# Patient Record
Sex: FEMALE | Race: WHITE
[De-identification: names, ages, dates, MRNs, and addresses within clinical notes are randomized per-mention and may not be internally consistent; named-entity substitution may affect disease eponyms.]

---

## 2018-02-23 ENCOUNTER — HOSPITAL ENCOUNTER (OUTPATIENT)
Dept: HOSPITAL 17 - NEPD | Age: 83
Setting detail: OBSERVATION
LOS: 1 days | Discharge: HOME | End: 2018-02-24
Attending: HOSPITALIST | Admitting: HOSPITALIST
Payer: COMMERCIAL

## 2018-02-23 VITALS
HEART RATE: 64 BPM | TEMPERATURE: 98.4 F | DIASTOLIC BLOOD PRESSURE: 75 MMHG | SYSTOLIC BLOOD PRESSURE: 170 MMHG | OXYGEN SATURATION: 96 % | RESPIRATION RATE: 17 BRPM

## 2018-02-23 VITALS — HEART RATE: 68 BPM

## 2018-02-23 VITALS — SYSTOLIC BLOOD PRESSURE: 160 MMHG | DIASTOLIC BLOOD PRESSURE: 65 MMHG

## 2018-02-23 VITALS
TEMPERATURE: 97.9 F | SYSTOLIC BLOOD PRESSURE: 153 MMHG | HEART RATE: 66 BPM | DIASTOLIC BLOOD PRESSURE: 70 MMHG | RESPIRATION RATE: 17 BRPM | OXYGEN SATURATION: 95 %

## 2018-02-23 VITALS
OXYGEN SATURATION: 94 % | SYSTOLIC BLOOD PRESSURE: 176 MMHG | DIASTOLIC BLOOD PRESSURE: 81 MMHG | HEART RATE: 80 BPM | TEMPERATURE: 99.2 F | RESPIRATION RATE: 16 BRPM

## 2018-02-23 VITALS
SYSTOLIC BLOOD PRESSURE: 160 MMHG | DIASTOLIC BLOOD PRESSURE: 70 MMHG | HEART RATE: 66 BPM | TEMPERATURE: 98 F | RESPIRATION RATE: 21 BRPM | OXYGEN SATURATION: 96 %

## 2018-02-23 VITALS — WEIGHT: 120.15 LBS | HEIGHT: 57 IN | BODY MASS INDEX: 25.92 KG/M2

## 2018-02-23 DIAGNOSIS — R94.31: ICD-10-CM

## 2018-02-23 DIAGNOSIS — E03.9: ICD-10-CM

## 2018-02-23 DIAGNOSIS — E78.5: ICD-10-CM

## 2018-02-23 DIAGNOSIS — K21.9: ICD-10-CM

## 2018-02-23 DIAGNOSIS — R10.11: Primary | ICD-10-CM

## 2018-02-23 DIAGNOSIS — H91.90: ICD-10-CM

## 2018-02-23 DIAGNOSIS — Z86.73: ICD-10-CM

## 2018-02-23 DIAGNOSIS — D64.9: ICD-10-CM

## 2018-02-23 DIAGNOSIS — I10: ICD-10-CM

## 2018-02-23 LAB
ALBUMIN SERPL-MCNC: 3.4 GM/DL (ref 3.4–5)
ALP SERPL-CCNC: 105 U/L (ref 45–117)
ALT SERPL-CCNC: 20 U/L (ref 10–53)
AST SERPL-CCNC: 17 U/L (ref 15–37)
BASOPHILS # BLD AUTO: 0 TH/MM3 (ref 0–0.2)
BASOPHILS NFR BLD: 0.5 % (ref 0–2)
BILIRUB SERPL-MCNC: 0.4 MG/DL (ref 0.2–1)
BUN SERPL-MCNC: 12 MG/DL (ref 7–18)
CALCIUM SERPL-MCNC: 8.8 MG/DL (ref 8.5–10.1)
CHLORIDE SERPL-SCNC: 108 MEQ/L (ref 98–107)
CREAT SERPL-MCNC: 0.76 MG/DL (ref 0.5–1)
EOSINOPHIL # BLD: 0.2 TH/MM3 (ref 0–0.4)
EOSINOPHIL NFR BLD: 2.4 % (ref 0–4)
ERYTHROCYTE [DISTWIDTH] IN BLOOD BY AUTOMATED COUNT: 13.4 % (ref 11.6–17.2)
GFR SERPLBLD BASED ON 1.73 SQ M-ARVRAT: 72 ML/MIN (ref 89–?)
GLUCOSE SERPL-MCNC: 135 MG/DL (ref 74–106)
HCO3 BLD-SCNC: 24.6 MEQ/L (ref 21–32)
HCT VFR BLD CALC: 36.4 % (ref 35–46)
HGB BLD-MCNC: 12.2 GM/DL (ref 11.6–15.3)
INR PPP: 1 RATIO
LYMPHOCYTES # BLD AUTO: 1.9 TH/MM3 (ref 1–4.8)
LYMPHOCYTES NFR BLD AUTO: 21.6 % (ref 9–44)
MCH RBC QN AUTO: 32.4 PG (ref 27–34)
MCHC RBC AUTO-ENTMCNC: 33.5 % (ref 32–36)
MCV RBC AUTO: 96.7 FL (ref 80–100)
MONOCYTE #: 0.6 TH/MM3 (ref 0–0.9)
MONOCYTES NFR BLD: 6.7 % (ref 0–8)
NEUTROPHILS # BLD AUTO: 6.2 TH/MM3 (ref 1.8–7.7)
NEUTROPHILS NFR BLD AUTO: 68.8 % (ref 16–70)
PLATELET # BLD: 222 TH/MM3 (ref 150–450)
PMV BLD AUTO: 8.2 FL (ref 7–11)
PROT SERPL-MCNC: 7.4 GM/DL (ref 6.4–8.2)
PROTHROMBIN TIME: 10 SEC (ref 9.8–11.6)
RBC # BLD AUTO: 3.77 MIL/MM3 (ref 4–5.3)
SODIUM SERPL-SCNC: 140 MEQ/L (ref 136–145)
TROPONIN I SERPL-MCNC: (no result) NG/ML (ref 0.02–0.05)
TROPONIN I SERPL-MCNC: (no result) NG/ML (ref 0.02–0.05)
WBC # BLD AUTO: 9 TH/MM3 (ref 4–11)

## 2018-02-23 PROCEDURE — 83690 ASSAY OF LIPASE: CPT

## 2018-02-23 PROCEDURE — 96372 THER/PROPH/DIAG INJ SC/IM: CPT

## 2018-02-23 PROCEDURE — 76705 ECHO EXAM OF ABDOMEN: CPT

## 2018-02-23 PROCEDURE — 80053 COMPREHEN METABOLIC PANEL: CPT

## 2018-02-23 PROCEDURE — 84484 ASSAY OF TROPONIN QUANT: CPT

## 2018-02-23 PROCEDURE — 85730 THROMBOPLASTIN TIME PARTIAL: CPT

## 2018-02-23 PROCEDURE — 99285 EMERGENCY DEPT VISIT HI MDM: CPT

## 2018-02-23 PROCEDURE — 74176 CT ABD & PELVIS W/O CONTRAST: CPT

## 2018-02-23 PROCEDURE — A9537 TC99M MEBROFENIN: HCPCS

## 2018-02-23 PROCEDURE — 85610 PROTHROMBIN TIME: CPT

## 2018-02-23 PROCEDURE — 82550 ASSAY OF CK (CPK): CPT

## 2018-02-23 PROCEDURE — G0378 HOSPITAL OBSERVATION PER HR: HCPCS

## 2018-02-23 PROCEDURE — 96360 HYDRATION IV INFUSION INIT: CPT

## 2018-02-23 PROCEDURE — 85025 COMPLETE CBC W/AUTO DIFF WBC: CPT

## 2018-02-23 PROCEDURE — 78227 HEPATOBIL SYST IMAGE W/DRUG: CPT

## 2018-02-23 PROCEDURE — 93005 ELECTROCARDIOGRAM TRACING: CPT

## 2018-02-23 RX ADMIN — Medication SCH ML: at 21:02

## 2018-02-23 RX ADMIN — HEPARIN SODIUM SCH UNITS: 10000 INJECTION, SOLUTION INTRAVENOUS; SUBCUTANEOUS at 21:03

## 2018-02-23 RX ADMIN — STANDARDIZED SENNA CONCENTRATE AND DOCUSATE SODIUM SCH TAB: 8.6; 5 TABLET, FILM COATED ORAL at 21:00

## 2018-02-23 RX ADMIN — LISINOPRIL SCH MG: 10 TABLET ORAL at 21:01

## 2018-02-23 RX ADMIN — HEPARIN SODIUM SCH UNITS: 10000 INJECTION, SOLUTION INTRAVENOUS; SUBCUTANEOUS at 21:00

## 2018-02-23 NOTE — RADRPT
EXAM DATE/TIME:  02/23/2018 10:55 

 

[facility] COMPARISON:     

No previous studies available for comparison.

 

 

INDICATIONS :     

Right upper quadrant and flank pain.

                  

 

ORAL CONTRAST:      

No oral contrast ingested.

                  

 

RADIATION DOSE:     

7.84 CTDIvol (mGy) 

 

 

MEDICAL HISTORY :     

Cerebrovascular disease.  

 

SURGICAL HISTORY :      

None. 

 

ENCOUNTER:      

Initial

 

ACUITY:      

1 day

 

PAIN SCALE:      

5/10

 

LOCATION:       

Right upper quadrant 

 

TECHNIQUE:     

Renal colic protocol.  Volumetric scanning of the abdomen and pelvis was performed.  Using automated 
exposure control and adjustment of the mA and/or kV according to patient size, radiation dose was kep
t as low as reasonably achievable to obtain optimal diagnostic quality images.  DICOM format image da
ta is available electronically for review and comparison.  

 

FINDINGS:     

 

Right side: 

No evidence hydronephrosis.  No calcified stones in the collecting system or in the right ureter.

 

Left side: 

No evidence of hydronephrosis.  No calcified stones in the collecting system or along the left ureter
.  1 cm hypodensity in the cortex of the lateral mid/lower pole which cannot be further characterized
 on noncontrast study, possibly representing cysts.

 

Bladder: 

Smooth margins.  No calcifications within the lumen.

 

Other: 

No dilated loops of small and large bowel.  No calcified gallstones.  No evidence of free fluid stop 
mild curvature of the lower lumbar spine convex towards the left.

 

CONCLUSION:     

Negative renal colic CT.

 

 

 

 Edinson Momin MD on February 23, 2018 at 11:37           

Board Certified Radiologist.

 This report was verified electronically.

## 2018-02-23 NOTE — PD
HPI


Chief Complaint:  abdominal pain


Time Seen by Provider:  10:18


Travel History


International Travel<30 days:  No


Contact w/Intl Traveler<30days:  No


Traveled to known affect area:  No





History of Present Illness


HPI


c/o ruq pain, radiated to right shoulder blade, sharp, constant for past 2 weeks

, only changing in intensity, rates 8/10, denies n/v/d/fever/ha/cp at this 

time....patient states that taking deep breaths worsens radiated pain.  














nkda


pcp cora aceves


pmhx:htn, hyperchol, hypothyroid, cva with left sided weakness


pshx:





PFSH


Past Medical History


Cerebrovascular Accident:  Yes (2013, left side weakness)


Diminished Hearing:  Yes





Social History


Alcohol Use:  No


Tobacco Use:  No


Substance Use:  No





Allergies-Medications


(Allergen,Severity, Reaction):  


Coded Allergies:  


     No Known Allergies (Unverified , 2/23/18)


Reported Meds & Prescriptions





Reported Meds & Active Scripts


Active


Reported


Lisinopril 10 Mg Tab 10 Mg PO BID


Levothyroxine (Levothyroxine Sodium) 100 Mcg Tab 100 Mcg PO DAILY


Plavix (Clopidogrel Bisulfate) 75 Mg Tab 75 Mg PO DAILY


Atorvastatin (Atorvastatin Calcium) 40 Mg Tab 40 Mg PO HS


Atenolol 100 Mg Tab 100 Mg PO DAILY


Amlodipine (Amlodipine Besylate) 5 Mg Tab 5 Mg PO DAILY








Review of Systems


General / Constitutional:  No: Fever


Eyes:  No: Visual changes


HENT:  No: Headaches


Cardiovascular:  No: Chest Pain or Discomfort


Respiratory:  No: Shortness of Breath


Gastrointestinal:  Positive: Abdominal Pain (ruq)


Genitourinary:  No: Dysuria


Musculoskeletal:  No: Pain


Skin:  No Rash


Neurologic:  No: Weakness


Psychiatric:  No: Depression


Endocrine:  No: Polydipsia


Hematologic/Lymphatic:  No: Easy Bruising





Physical Exam


Narrative


GENERAL: 


SKIN: Warm and dry.


HEAD: Atraumatic. Normocephalic. 


EYES: Pupils equal and round. No scleral icterus. No injection or drainage. 


ENT: No nasal bleeding or discharge.  Mucous membranes pink and moist.


NECK: Trachea midline. No JVD. 


CARDIOVASCULAR: Regular rate and rhythm.  


RESPIRATORY: No accessory muscle use. Clear to auscultation. Breath sounds 

equal bilaterally. 


GASTROINTESTINAL: Abdomen soft, nondistended. 


MUSCULOSKELETAL: Extremities without clubbing, cyanosis, or edema. No obvious 

deformities. 


NEUROLOGICAL: Awake and alert. No obvious cranial nerve deficits.  Motor 

grossly within normal limits. Five out of 5 muscle strength in the arms and 

legs.  Normal speech.


PSYCHIATRIC: Appropriate mood and affect; insight and judgment normal.





Data


Data


Last Documented VS





Vital Signs








  Date Time  Temp Pulse Resp B/P (MAP) Pulse Ox O2 Delivery O2 Flow Rate FiO2


 


2/23/18 10:06   17   Room Air  


 


2/23/18 10:06        


 


2/23/18 09:48 99.2 80   94   








Orders





 Orders


Complete Blood Count With Diff (2/23/18 10:33)


Comprehensive Metabolic Panel (2/23/18 10:33)


Lipase (2/23/18 10:33)


Prothrombin Time / Inr (Pt) (2/23/18 10:33)


Act Partial Throm Time (Ptt) (2/23/18 10:33)


Ct Abd/Pel W/O Iv Contrast (2/23/18 10:33)


Us Abdomen Gallbladder (2/23/18 )


Iv Access Insert/Monitor (2/23/18 10:33)


Ecg Monitoring (2/23/18 10:33)


Oximetry (2/23/18 10:33)


NPO (2/23/18 10:33)


Morphine Inj (Morphine Inj) (2/23/18 10:45)


Ondansetron Inj (Zofran Inj) (2/23/18 10:45)


Sodium Chlor 0.9% 1000 Ml Inj (Ns 1000 M (2/23/18 10:33)


Sodium Chloride 0.9% Flush (Ns Flush) (2/23/18 10:45)


Electrocardiogram (2/23/18 10:33)


Ckmb (Isoenzyme) Profile (2/23/18 10:34)


Troponin I (2/23/18 10:34)





Labs





Laboratory Tests








Test


  2/23/18


10:50


 


White Blood Count 9.0 TH/MM3 


 


Red Blood Count 3.77 MIL/MM3 


 


Hemoglobin 12.2 GM/DL 


 


Hematocrit 36.4 % 


 


Mean Corpuscular Volume 96.7 FL 


 


Mean Corpuscular Hemoglobin 32.4 PG 


 


Mean Corpuscular Hemoglobin


Concent 33.5 % 


 


 


Red Cell Distribution Width 13.4 % 


 


Platelet Count 222 TH/MM3 


 


Mean Platelet Volume 8.2 FL 


 


Neutrophils (%) (Auto) 68.8 % 


 


Lymphocytes (%) (Auto) 21.6 % 


 


Monocytes (%) (Auto) 6.7 % 


 


Eosinophils (%) (Auto) 2.4 % 


 


Basophils (%) (Auto) 0.5 % 


 


Neutrophils # (Auto) 6.2 TH/MM3 


 


Lymphocytes # (Auto) 1.9 TH/MM3 


 


Monocytes # (Auto) 0.6 TH/MM3 


 


Eosinophils # (Auto) 0.2 TH/MM3 


 


Basophils # (Auto) 0.0 TH/MM3 


 


CBC Comment DIFF FINAL 


 


Differential Comment  


 


Prothrombin Time 10.0 SEC 


 


Prothromb Time International


Ratio 1.0 RATIO 


 


 


Activated Partial


Thromboplast Time 22.7 SEC 


 


 


Blood Urea Nitrogen 12 MG/DL 


 


Creatinine 0.76 MG/DL 


 


Random Glucose 135 MG/DL 


 


Total Protein 7.4 GM/DL 


 


Albumin 3.4 GM/DL 


 


Calcium Level 8.8 MG/DL 


 


Alkaline Phosphatase 105 U/L 


 


Aspartate Amino Transf


(AST/SGOT) 17 U/L 


 


 


Alanine Aminotransferase


(ALT/SGPT) 20 U/L 


 


 


Total Bilirubin 0.4 MG/DL 


 


Sodium Level 140 MEQ/L 


 


Potassium Level 3.8 MEQ/L 


 


Chloride Level 108 MEQ/L 


 


Carbon Dioxide Level 24.6 MEQ/L 


 


Anion Gap 7 MEQ/L 


 


Estimat Glomerular Filtration


Rate 72 ML/MIN 


 


 


Total Creatine Kinase 73 U/L 


 


Troponin I


  LESS THAN 0.02


NG/ML


 


Lipase 111 U/L 











[facility]


Medical Decision Making


Medical Screen Exam Complete:  Yes


Emergency Medical Condition:  Yes


Medical Record Reviewed:  Yes


Differential Diagnosis


biliary colic v cholecystitis v pancreatitis v hepatitis v colitis v 

diverticulitis V ACALCULOUS CHOLECYSTITIS V ATYPICAL NONSTEMI


Narrative Course


CBC NEG FOR LEUKOCYTOSIS, NO ANEMIA, NORMAL PLATELET





COAG PROFILE NORMAL





NL ELECTROLYTES, NL KIDNEY FUNCTION, NORMAL LIVER/PANCREAS FUNCTION





CAT SCAN NEG FOR DIVERTIC/COLITIS/FREE AIR/SBO OR ILEUS





ULTRASOUND SHOWS NO GALLSTONE/GB WALL THICKENING/OR PERICHOL FLUID





Diagnosis





 Primary Impression:  


 RIGHT PHRENIC IRRITATION


 Additional Impression:  


 ATYPICAL NONSTEMI V ACALCULOUS CHOLECYSTITIS


Patient Instructions:  Gallbladder Ejection Fraction (GEN), General Instructions











Chuck Walker MD Feb 23, 2018 10:22

## 2018-02-23 NOTE — HHI.HP
__________________________________________________





[facility]


Service


[facility]


Primary Care Physician


Wesley Cerda MD


Admission Diagnosis





ACALCULOUS CHOLECYSTITIS, R/O NONSTEMI


Diagnoses:  


Travel History


International Travel<30 Days:  No


Contact w/Intl Traveler <30 Da:  No


Traveled to Known Affected Are:  No


History of Present Illness


Patient is a very pleasant 85-year-old female with past medical history of 

hyperlipidemia, hypertension, hypothyroidism, CVA in  presented to the 

emergency room upon the request of her primary care physician with complaint of 

right upper quadrant pain.  She tells me her pain is worse with deep breathing 

and sneezing which started last Thursday.  She tells me the pain is a 5 out of 

10 radiates to her back and shoulder blades  Pain comes and goes.  Over the 

weekend, pain had gone away however on Monday the pain came back.  She denies 

any associated nausea or vomiting, fevers or chills.  She has not seen a 

gastroenterologist for this.  She did see her heart doctor, Dr. Ovalles on 

 and per patient everything was "okay ".  She denies any chest pains, 

shortness of breath.  She is hopeful to go home soon.  Denies any abdominal 

pain other than the right upper quadrant area.  She denies any burning with 

urination or increased urinary frequency.





Review of Systems


Except as stated in HPI:  all other systems reviewed are Neg





Past Family Social History


Past Medical History


hyperlipidemia, hypertension, hypothyroidism, CVA in 


Past Surgical History


Denies any prior surgery


Reported Medications





Reported Meds & Active Scripts


Active


Reported


Lisinopril 10 Mg Tab 10 Mg PO BID


Levothyroxine (Levothyroxine Sodium) 100 Mcg Tab 100 Mcg PO DAILY


Plavix (Clopidogrel Bisulfate) 75 Mg Tab 75 Mg PO DAILY


Atorvastatin (Atorvastatin Calcium) 40 Mg Tab 40 Mg PO HS


Atenolol 100 Mg Tab 100 Mg PO DAILY


Amlodipine (Amlodipine Besylate) 5 Mg Tab 5 Mg PO DAILY


Allergies:  


Coded Allergies:  


     No Known Allergies (Unverified , 18)


Family History


Mother had a stroke and a heart attack she  at the age of 95.  Father  

young possible CVA


Social History


Denies any smoking history, alcohol use or illegal drug use.





Physical Exam


Vital Signs





Vital Signs








  Date Time  Temp Pulse Resp B/P (MAP) Pulse Ox O2 Delivery O2 Flow Rate FiO2


 


18 13:48        


 


18 10:06   17   Room Air  


 


18 10:06      Room Air  


 


18 09:48 99.2 80 16 176/81 [phone] 94 Room Air  








Physical Exam


GENERAL: This is an elderly female, laying in bed, pressing against her right 

upper quadrant


SKIN: No rashes, ecchymoses or lesions. Cool and dry.


HEAD: Atraumatic. Normocephalic. No temporal or scalp tenderness.


EYES: Pupils equal round and reactive. Extraocular motions intact. No scleral 

icterus. No injection or drainage. 


ENT: Nose without drainage.  Airway patent.


NECK: Trachea midline.  


CARDIOVASCULAR: Regular rate and rhythm without murmurs 


RESPIRATORY: Clear to auscultation. Breath sounds equal bilaterally. No wheezes 


GASTROINTESTINAL: Abdomen soft, tender to deep palpation in the right upper 

quadrant, ? mon's sign, pt is able to reproduce it herself. No tenderness w 

palpation of the right side of chest. 


MUSCULOSKELETAL: Extremities without  edema.  No calf tenderness. Negative 

Homans sign bilaterally.


NEUROLOGICAL: Awake and alert. Cranial nerves II through XII intact.  Motor and 

sensory grossly within normal limits.  Normal speech.


Laboratory





Laboratory Tests








Test


  18


10:50


 


White Blood Count 9.0 


 


Red Blood Count 3.77 


 


Hemoglobin 12.2 


 


Hematocrit 36.4 


 


Mean Corpuscular Volume 96.7 


 


Mean Corpuscular Hemoglobin 32.4 


 


Mean Corpuscular Hemoglobin


Concent 33.5 


 


 


Red Cell Distribution Width 13.4 


 


Platelet Count 222 


 


Mean Platelet Volume 8.2 


 


Neutrophils (%) (Auto) 68.8 


 


Lymphocytes (%) (Auto) 21.6 


 


Monocytes (%) (Auto) 6.7 


 


Eosinophils (%) (Auto) 2.4 


 


Basophils (%) (Auto) 0.5 


 


Neutrophils # (Auto) 6.2 


 


Lymphocytes # (Auto) 1.9 


 


Monocytes # (Auto) 0.6 


 


Eosinophils # (Auto) 0.2 


 


Basophils # (Auto) 0.0 


 


CBC Comment DIFF FINAL 


 


Differential Comment  


 


Prothrombin Time 10.0 


 


Prothromb Time International


Ratio 1.0 


 


 


Activated Partial


Thromboplast Time 22.7 


 


 


Blood Urea Nitrogen 12 


 


Creatinine 0.76 


 


Random Glucose 135 


 


Total Protein 7.4 


 


Albumin 3.4 


 


Calcium Level 8.8 


 


Alkaline Phosphatase 105 


 


Aspartate Amino Transf


(AST/SGOT) 17 


 


 


Alanine Aminotransferase


(ALT/SGPT) 20 


 


 


Total Bilirubin 0.4 


 


Sodium Level 140 


 


Potassium Level 3.8 


 


Chloride Level 108 


 


Carbon Dioxide Level 24.6 


 


Anion Gap 7 


 


Estimat Glomerular Filtration


Rate 72 


 


 


Total Creatine Kinase 73 


 


Troponin I LESS THAN 0.02 


 


Lipase 111 








Result Diagram:  


18 1050                                                                   

             18 1050





Imaging





Last Impressions








Abdomen/Pelvis CT 18 1033 Signed





Impressions: 





 Service Date/Time:  2018 10:55 - CONCLUSION:  Negative 





 renal colic CT.     Edinson Momin MD 


 


Gall Bladder Ultrasound 18 0000 Signed





Impressions: 





 Service Date/Time:  2018 10:52 - CONCLUSION: Normal 





 examination.       Edinson De Dios Jr., MD 











[place] VTE Risk Assessment


[place] VTE Risk Assessment:  Mod/High Risk (score >= 2)


[place]ini Risk Assessment Model











 Point Value = 1          Point Value = 2  Point Value = 3  Point Value = 5


 


Age 41-60


Minor surgery


BMI > 25 kg/m2


Swollen legs


Varicose veins


Pregnancy or postpartum


History of unexplained or recurrent


   spontaneous 


Oral contraceptives or hormone


   replacement


Sepsis (< 1 month)


Serious lung disease, including


   pneumonia (< 1 month)


Abnormal pulmonary function


Acute myocardial infarction


Congestive heart failure (< 1 month)


History of inflammatory bowel disease


Medical patient at bed rest Age 61-74


Arthroscopic surgery


Major open surgery (> 45 min)


Laparoscopic surgery (> 45 min)


Malignancy


Confined to bed (> 72 hours)


Immobilizing plaster cast


Central venous access Age >= 75


History of VTE


Family history of VTE


Factor V Leiden


Prothrombin 68719M


Lupus anticoagulant


Anticardiolipin antibodies


Elevated serum homocysteine


Heparin-induced thrombocytopenia


Other congenital or acquired


   thrombophilia Stroke (< 1 month)


Elective arthroplasty


Hip, pelvis, or leg fracture


Acute spinal cord injury (< 1 month)








Prophylaxis Regimen











   Total Risk


Factor Score Risk Level Prophylaxis Regimen


 


0-1      Low Early ambulation


 


2 Moderate Order ONE of the following:


*Sequential Compression Device (SCD)


*Heparin 5000 units SQ BID


 


3-4 Higher Order ONE of the following medications:


*Heparin 5000 units SQ TID


*Enoxaparin/Lovenox 40 mg SQ daily (WT < 150 kg, CrCl > 30 mL/min)


*Enoxaparin/Lovenox 30 mg SQ daily (WT < 150 kg, CrCl > 10-29 mL/min)


*Enoxaparin/Lovenox 30 mg SQ BID (WT < 150 kg, CrCl > 30 mL/min)


AND/OR


*Sequential Compression Device (SCD)


 


5 or more Highest Order ONE of the following medications:


*Heparin 5000 units SQ TID (Preferred with Epidurals)


*Enoxaparin/Lovenox 40 mg SQ daily (WT < 150 kg, CrCl > 30 mL/min)


*Enoxaparin/Lovenox 30 mg SQ daily (WT < 150 kg, CrCl > 10-29 mL/min)


*Enoxaparin/Lovenox 30 mg SQ BID (WT < 150 kg, CrCl > 30 mL/min)


AND


*Sequential Compression Device (SCD)











Assessment and Plan


Assessment and Plan


Right upper quadrant tenderness/pain: Questionable Mon sign.  Ultrasound of 

the gallbladder was negative, CT abdomen pelvis also negative for renal colic.  

At this time lipase and LFTs are within normal limits.  I will consult 

gastroenterology for further evaluation and recommendations.  I will order a 

HIDA scan.  Pain control with Norco as needed and morphine for breakthrough as 

needed.  Continue stool softeners.  IV hydration.  NPO for now. Await recs from 

GI. 


In addition, initial trop was 0.02. Will trend CE to r/o ACS. Monitor closely. 

Pt's cardiologist is Dr. Ovalles





hyperlipidemia, hypertension, hypothyroidism, CVA in : stable. home meds 

resumed





DVT proph: heparin


Code Status


full


Discussed Condition With


patient, daughter and ER physician











Ju Clark MD 2018 14:33

## 2018-02-23 NOTE — EKG
Date Performed: 02/23/2018       Time Performed: 19:22:55

 

PTAGE:      85 years

 

EKG:      Sinus rhythm 

 

 NONSPECIFIC ST & T-WAVE ABNORMALITY BORDERLINE ECG 

 

NO PREVIOUS TRACING            

 

DOCTOR:   Bismark Ovalles  Interpretating Date/Time  02/23/2018 23:01:29

## 2018-02-23 NOTE — EKG
Date Performed: 02/23/2018       Time Performed: 10:43:36

 

PTAGE:      85 years

 

EKG:      Sinus rhythm 

 

 BORDERLINE LEFT AXIS DEVIATION NONSPECIFIC T-WAVE ABNORMALITY BORDERLINE ECG 

 

NO PREVIOUS TRACING            

 

DOCTOR:   Bismark Ovalles  Interpretating Date/Time  02/23/2018 16:33:42

## 2018-02-23 NOTE — RADRPT
EXAM DATE/TIME:  02/23/2018 10:52 

 

[facility] COMPARISON:     

No previous studies available for comparison.

        

 

 

INDICATIONS :     

Right upper quadrant pain. 

                     

 

MEDICAL HISTORY :     

Stroke.     Hearing loss. Glasses. 

 

SURGICAL HISTORY :     

None.     

 

ENCOUNTER:     

Initial

 

ACUITY:     

4-6 days

 

PAIN SCORE:     

2/10

 

LOCATION:     

Right upper quadrant 

                     

MEASUREMENTS:     

 

LIVER:     

13.6 cm length 

 

COMMON DUCT:     

5 mm

 

RIGHT KIDNEY:     

9.6 x 4.7 x 4.8 cm

 

FINDINGS:     

 

LIVER:     

Normal echotexture without focal lesion or ductal dilatation.  

 

COMMON DUCT:     

No intraluminal mass or stone visualized.  

 

GALLBLADDER:          

Contains no stones, demonstrates no wall thickening or pericholecystic fluid.  

 

PANCREAS:          

The visualized portions are within normal limits.  

 

RIGHT KIDNEY:          

No evidence of hydronephrosis, stone, or mass.  

 

CONCLUSION:     Normal examination.  

 

 

 

 Edinson De Dios Jr., MD on February 23, 2018 at 11:48           

Board Certified Radiologist.

 This report was verified electronically.

## 2018-02-23 NOTE — EKG
Date Performed: 02/23/2018       Time Performed: 14:15:39

 

PTAGE:      85 years

 

EKG:      SINUS BRADYCARDIA PROBABLE ARM LEAD REVERSAL LATERAL MYOCARDIAL INFARCTION ABNORMAL ECG

 

PREVIOUS TRACING       : 02/23/2018 10.43 Compared to previous tracing, arm lead reversal is now evid
ent.

 

DOCTOR:   Bismark Ovalles  Interpretating Date/Time  02/23/2018 23:20:11

## 2018-02-23 NOTE — PD.CONS
HPI


History of Present Illness


This is a 85 year old with medical history significant for HTN, hyperlipidemia, 

history of CVA on Plavix.  Pt presented to the ER today with complaints of RUQ 

abdominal pain for the past week, pain is intermittent worse after coughing and 

with deep breathing. Pain radiates around right side of her ribs to her back.  

Denies relation to food.  Unsure of alleviating factors.  Denies associated 

nausea, vomiting, changes in bowel movement, heartburn, acid reflux, 

unintentional weight loss.  Pt has had CT abdomen and pelvis and US gallbladder

, both of which have been negative for any acute findings.  HIDA scan has been 

ordered by attending to rule out acalculous cholecystitis.  Labs WNL.  Pt 

denies ever having EGD.  Last colonoscopy was over ten years ago and she is 

unsure of findings.  Denies ETOH, smoking, NSAIDs.  


 (Polina Rubio)





PFSH


Past Medical History


hyperlipidemia, hypertension, hypothyroidism, CVA in 


Past Surgical History


Denies any prior surgery


 (Polina Rubio)


Coded Allergies:  


     No Known Allergies (Unverified , 18)


Family History


Mother had a stroke and a heart attack she  at the age of 95.  Father  

young possible CVA


Social History


Denies any smoking history, alcohol use or illegal drug use.


 (Polina Rubio)





Review of Systems


Gastrointestinal:  COMPLAINS OF: Abdominal pain, DENIES: Black stools, Bloody 

stools, Constipation, Diarrhea, Nausea, Vomiting, Difficulty Swallowing, 

Odynophagia, Swelling of Abdomen, Heartburn, Hematemesis (Polina Rubio)





GI Exam


Vitals I&O





Vital Signs








  Date Time  Temp Pulse Resp B/P (MAP) Pulse Ox O2 Delivery O2 Flow Rate FiO2


 


18 13:48        


 


18 10:06   17   Room Air  


 


18 10:06      Room Air  


 


18 09:48 99.2 80 16 176/81 [phone] Room Air  








Imaging





Last Impressions








Abdomen/Pelvis CT 18 1033 Signed





Impressions: 





 Service Date/Time:  2018 10:55 - CONCLUSION:  Negative 





 renal colic CT.     Edinson Momin MD 


 


Gall Bladder Ultrasound 18 0000 Signed





Impressions: 





 Service Date/Time:  2018 10:52 - CONCLUSION: Normal 





 examination.       Edinson De Dios Jr., MD 








Laboratory











Test


  18


10:50


 


White Blood Count 9.0 TH/MM3 


 


Red Blood Count 3.77 MIL/MM3 


 


Hemoglobin 12.2 GM/DL 


 


Hematocrit 36.4 % 


 


Mean Corpuscular Volume 96.7 FL 


 


Mean Corpuscular Hemoglobin 32.4 PG 


 


Mean Corpuscular Hemoglobin


Concent 33.5 % 


 


 


Red Cell Distribution Width 13.4 % 


 


Platelet Count 222 TH/MM3 


 


Mean Platelet Volume 8.2 FL 


 


Neutrophils (%) (Auto) 68.8 % 


 


Lymphocytes (%) (Auto) 21.6 % 


 


Monocytes (%) (Auto) 6.7 % 


 


Eosinophils (%) (Auto) 2.4 % 


 


Basophils (%) (Auto) 0.5 % 


 


Neutrophils # (Auto) 6.2 TH/MM3 


 


Lymphocytes # (Auto) 1.9 TH/MM3 


 


Monocytes # (Auto) 0.6 TH/MM3 


 


Eosinophils # (Auto) 0.2 TH/MM3 


 


Basophils # (Auto) 0.0 TH/MM3 


 


CBC Comment DIFF FINAL 


 


Differential Comment  


 


Prothrombin Time 10.0 SEC 


 


Prothromb Time International


Ratio 1.0 RATIO 


 


 


Activated Partial


Thromboplast Time 22.7 SEC 


 


 


Blood Urea Nitrogen 12 MG/DL 


 


Creatinine 0.76 MG/DL 


 


Random Glucose 135 MG/DL 


 


Total Protein 7.4 GM/DL 


 


Albumin 3.4 GM/DL 


 


Calcium Level 8.8 MG/DL 


 


Alkaline Phosphatase 105 U/L 


 


Aspartate Amino Transf


(AST/SGOT) 17 U/L 


 


 


Alanine Aminotransferase


(ALT/SGPT) 20 U/L 


 


 


Total Bilirubin 0.4 MG/DL 


 


Sodium Level 140 MEQ/L 


 


Potassium Level 3.8 MEQ/L 


 


Chloride Level 108 MEQ/L 


 


Carbon Dioxide Level 24.6 MEQ/L 


 


Anion Gap 7 MEQ/L 


 


Estimat Glomerular Filtration


Rate 72 ML/MIN 


 


 


Total Creatine Kinase 73 U/L 


 


Troponin I


  LESS THAN 0.02


NG/ML


 


Lipase 111 U/L 








Physical Examination


HEENT: Normocephalic; atraumatic; no jaundice


CHEST:  Even/unlabored


CARDIAC:  RRR


ABDOMEN:  Soft, nondistended, nontender; bowel sounds active 


EXTREMITIES: No clubbing, cyanosis, or edema.


SKIN:  Normal; no rash; no jaundice.


CNS:  No focal deficits; alert and oriented times three.


 (Polina Rubio)





Assessment and Plan


Plan


Assessment:


- Epigastric/RUQ pain radiating around right ribs to her back x 1 wk, 

intermittent,


  worse with deep breathing, coughing, and sneezing.  Unrelated to food.  Denies


  associated nausea, vomiting, acid reflux, heartburn, dysphagia melena.  US


  gallbladder and CT abdomen and pelvis negative for any acute findings. CMP and


  CBC WNL. HIDA scan ordered per attending to rule out acalculous 

cholecystitis.  


  Has never had EGD.  Denies ETOH, smoking, NSAIDs. 


- Plavix for history of CVA.





Plan:


HIDA scan pending


If abnormal, consult GS


Possible EGD on Monday if no indication for pain on HIDA scan


If pt DCd prior to Monday, EGD can be done on outpatient basis 


MARK


Further recommendations to follow based on results of above and clinical course





Pt has been seen and examined by myself and Dr. Ott and this note is 

written on his behalf 


 (Polina Rubio)


Physician Comments


Seen and examined with JIGNESH, hawkins -bryan so far. HIDA -ve. Pain seems to be 

musculoskeletal and started after a bout of coughing a week ago. If still 

symptomatic over the weekend consider EGD and /or ct with contrast next week. 

Discussed with daughter at the bedside. Dr. Schilling to follow. Thank you


 (Christel Ott MD)











Polina Rubio 2018 15:00


Christel Ott MD 2018 19:46

## 2018-02-23 NOTE — RADRPT
EXAM DATE/TIME:  02/23/2018 15:15 

 

[facility] COMPARISON:     

CT ABDOMEN & PELVIS W/O CONTRAST, February 23, 2018, 10:55.  US ABDOMEN - GALLBLADDER, February 23, 2
018, 10:52.

 

 

INDICATIONS :      

Abdominal pain.

                       

 

DOSE:      

4.4 mCi Tc99m Mebrofenin IV 

                       

 

MEDICATION:     

1.1 mcg Cholecystokinin IV; No symptomatic response.

Cholecystokinin was administered by slow infusion over 8 minutes beginning at 60 minutes.

                       

 

MEDICAL HISTORY :     

Stroke.   

 

SURGICAL HISTORY :        

None.  

 

ENCOUNTER:     

Initial

 

ACUITY:     

1 day

 

PAIN SCALE:     

8/10

 

LOCATION:      

Right upper quadrant 

 

TECHNIQUE:     

Following the intravenous administration of radiotracer, dynamic sequential image were performed with
 continuous acquisition.  Time-activity curves were generated.

 

FINDINGS:     

 

HEPATIIC KINETICS:     

There is prompt uptake of radiotracer in the liver.  No focal defects are seen.  There is normal rate
 of washout from the hepatic parenchyma.

 

BILIARY CLEARANCE:     

Activity is first seen in the extrahepatic biliary system at 7 minutes.  There is normal excretion in
to the small bowel.

 

GALLBLADDER:     

Activity is first seen in the gallbladder at 14 minutes.

 

POST CHOLECYSTOKININ:     

After Cholecystokinin administration, there is prompt emptying of the gallbladder with a 50 % ejectio
n fraction.  Common bile duct kinetics are normal and there is no evidence of biliary obstruction.

 

BILIARY ENTERIC REFLUX:     

Episode of mild biliary enteric reflux occurs at 73 minutes, after CCK administration.

 

CLINICAL:     

The patient was asymptomatic after Cholecystokinin administration.

 

CONCLUSION:     

1. Proper visualization of the gallbladder and normal gallbladder ejection fraction.

2. Mild to moderate episode of biliary enteric reflux which occurs after CCK administration.

 

 

 Edinson Momin MD on February 23, 2018 at 17:17           

Board Certified Radiologist.

 This report was verified electronically.

## 2018-02-24 VITALS
HEART RATE: 75 BPM | RESPIRATION RATE: 20 BRPM | OXYGEN SATURATION: 95 % | TEMPERATURE: 97.6 F | DIASTOLIC BLOOD PRESSURE: 75 MMHG | SYSTOLIC BLOOD PRESSURE: 174 MMHG

## 2018-02-24 VITALS
DIASTOLIC BLOOD PRESSURE: 65 MMHG | TEMPERATURE: 98.1 F | HEART RATE: 62 BPM | RESPIRATION RATE: 17 BRPM | OXYGEN SATURATION: 95 % | SYSTOLIC BLOOD PRESSURE: 140 MMHG

## 2018-02-24 VITALS — HEART RATE: 60 BPM

## 2018-02-24 VITALS
TEMPERATURE: 98.2 F | SYSTOLIC BLOOD PRESSURE: 151 MMHG | OXYGEN SATURATION: 95 % | HEART RATE: 67 BPM | RESPIRATION RATE: 16 BRPM | DIASTOLIC BLOOD PRESSURE: 67 MMHG

## 2018-02-24 LAB — TROPONIN I SERPL-MCNC: (no result) NG/ML (ref 0.02–0.05)

## 2018-02-24 RX ADMIN — HEPARIN SODIUM SCH UNITS: 10000 INJECTION, SOLUTION INTRAVENOUS; SUBCUTANEOUS at 11:04

## 2018-02-24 RX ADMIN — Medication SCH ML: at 09:01

## 2018-02-24 RX ADMIN — STANDARDIZED SENNA CONCENTRATE AND DOCUSATE SODIUM SCH TAB: 8.6; 5 TABLET, FILM COATED ORAL at 09:00

## 2018-02-24 RX ADMIN — LISINOPRIL SCH MG: 10 TABLET ORAL at 09:04

## 2018-02-24 NOTE — HHI.GIFU
Subjective


Remarks


resting in the bed


family in


still having RUQ pain , but not requiring pain meds this am, radiating up into 

her right shoulder


Sipping on clear liquids without nausea or vomiting


 (Kathy Finney)





Objective


Vitals I&O





Vital Signs








  Date Time  Temp Pulse Resp B/P (MAP) Pulse Ox O2 Delivery O2 Flow Rate FiO2


 


2/24/18 04:34  60      


 


2/24/18 03:48 98.1 62 17 140/65 (90) 95   


 


2/24/18 00:30  60      


 


2/23/18 23:16 97.9 66 17 153/70 (97) 95   


 


2/23/18 20:10  68      


 


2/23/18 19:26 98.4 64 17 170/75 (106) 96   


 


2/23/18 18:31    160/65 (96)    


 


2/23/18 14:54 98.0 66 21 160/70 (100) 96   


 


2/23/18 13:48        


 


2/23/18 10:06   17   Room Air  


 


2/23/18 10:06      Room Air  


 


2/23/18 09:48 99.2 80 16 176/81 (112) 94 Room Air  














I/O      


 


 2/23/18 2/23/18 2/23/18 2/24/18 2/24/18 2/24/18





 07:00 15:00 23:00 07:00 15:00 23:00


 


Intake Total    1240 ml  


 


Balance    1240 ml  


 


      


 


Intake Oral    240 ml  


 


IV Total    1000 ml  


 


# Voids  1  2  








Laboratory





Laboratory Tests








Test


  2/23/18


10:50 2/23/18


18:36 2/24/18


00:20


 


White Blood Count 9.0   


 


Red Blood Count 3.77   


 


Hemoglobin 12.2   


 


Hematocrit 36.4   


 


Mean Corpuscular Volume 96.7   


 


Mean Corpuscular Hemoglobin 32.4   


 


Mean Corpuscular Hemoglobin


Concent 33.5 


  


  


 


 


Red Cell Distribution Width 13.4   


 


Platelet Count 222   


 


Mean Platelet Volume 8.2   


 


Neutrophils (%) (Auto) 68.8   


 


Lymphocytes (%) (Auto) 21.6   


 


Monocytes (%) (Auto) 6.7   


 


Eosinophils (%) (Auto) 2.4   


 


Basophils (%) (Auto) 0.5   


 


Neutrophils # (Auto) 6.2   


 


Lymphocytes # (Auto) 1.9   


 


Monocytes # (Auto) 0.6   


 


Eosinophils # (Auto) 0.2   


 


Basophils # (Auto) 0.0   


 


CBC Comment DIFF FINAL   


 


Differential Comment    


 


Prothrombin Time 10.0   


 


Prothromb Time International


Ratio 1.0 


  


  


 


 


Activated Partial


Thromboplast Time 22.7 


  


  


 


 


Blood Urea Nitrogen 12   


 


Creatinine 0.76   


 


Random Glucose 135   


 


Total Protein 7.4   


 


Albumin 3.4   


 


Calcium Level 8.8   


 


Alkaline Phosphatase 105   


 


Aspartate Amino Transf


(AST/SGOT) 17 


  


  


 


 


Alanine Aminotransferase


(ALT/SGPT) 20 


  


  


 


 


Total Bilirubin 0.4   


 


Sodium Level 140   


 


Potassium Level 3.8   


 


Chloride Level 108   


 


Carbon Dioxide Level 24.6   


 


Anion Gap 7   


 


Estimat Glomerular Filtration


Rate 72 


  


  


 


 


Total Creatine Kinase 73  70  61 


 


Troponin I LESS THAN 0.02  LESS THAN 0.02  LESS THAN 0.02 


 


Lipase 111   








Imaging





Last Impressions








Abdomen/Pelvis CT 2/23/18 1033 Signed





Impressions: 





 Service Date/Time:  Friday, February 23, 2018 10:55 - CONCLUSION:  Negative 





 renal colic CT.     Edinson Momin MD 


 


Hepatobiliary Scan Nuclear Medicine 2/23/18 0000 Signed





Impressions: 





 Service Date/Time:  Friday, February 23, 2018 15:15 - CONCLUSION:  1. Proper 





 visualization of the gallbladder and normal gallbladder ejection fraction. 2. 





 Mild to moderate episode of biliary enteric reflux which occurs after CCK 





 administration.    Edinson Momin MD 


 


Gall Bladder Ultrasound 2/23/18 0000 Signed





Impressions: 





 Service Date/Time:  Friday, February 23, 2018 10:52 - CONCLUSION: Normal 





 examination.       Edinson De Dios Jr., MD 








Physical Exam


HEENT: Pupils round and reactive to light; normocephalic; atraumatic; no 

jaundice.  Oral cavity clean


NECK: Neck is supple, no JVD, no lymphadenopathy.


CHEST:  Chest is clear to auscultation and percussion.


CARDIAC:  Regular rate and rhythm with no murmur gallop or rubs.


ABDOMEN:  Soft, nondistended, right upper quadrant tenderness on light 

palpation radiating into right shoulder; no hepatosplenomegaly; bowel sounds 

soft


EXTREMITIES: No clubbing, cyanosis, or edema.


SKIN:  Pale, thin turgor; no rash; no jaundice.


CNS:  No focal deficits; alert and oriented times three.


 (Kathy Finney)





Assessment and Plan


Plan


Assessment:/History


- Epigastric/RUQ pain radiating around right ribs to her back x 1 wk, 

intermittent,


  worse with deep breathing, coughing, and sneezing.  Unrelated to food.  Denies


  associated nausea, vomiting, acid reflux, heartburn, dysphagia melena.  


US gallbladder and CT abdomen negative for any acute findings. 


HIDA scan normal  ejection, mild to moderate biliary reflux after CCK


Anemia, mild , unspecified, no obvious bleeding noted.  Hemoglobin 12.2


Currently denies any nausea or vomiting, does note some coughing over the past 

week.  Spoke with  and exp family lained current findings.





Plan:


Diet clear liquids observe for toleration or upper GI symptoms


Consider EGD and repeat CT scan, TBA, if symptoms persist


Monitor labs


Encouraged increased activity and ambulation.


Bowel regimen


Monitor for any acute bleeding


Supportive care


Further recommendations to follow based on results of above and clinical course





Pt has been seen and examined by myself and Dr. Schilling, note is written on 

his behalf 


 (Kathy Finney)


Plan


Patient was seen and examined, agree with above-noted, I had a discussion with 

the patient and her daughter, most of her symptoms is related to cough and when 

she bleed deeply, looks like it's a musculoskeletal pain in the right side, 

some ribs tenderness, we talked about possibly doing an endoscopy but she 

preferred not to have it done, if that's the case I would suggest increasing 

diet as tolerated, having patient mobilize, treat musculoskeletal pain, if she 

changes her mind and wanted to do upper endoscopy we can perform that, not 

likely to be related to gallbladder disease


 (Mishel Schilling MD)











Kathy Finney Feb 24, 2018 09:17


Mishel Schilling MD Feb 24, 2018 11:07